# Patient Record
Sex: FEMALE | URBAN - METROPOLITAN AREA
[De-identification: names, ages, dates, MRNs, and addresses within clinical notes are randomized per-mention and may not be internally consistent; named-entity substitution may affect disease eponyms.]

---

## 2019-06-27 ENCOUNTER — APPOINTMENT (RX ONLY)
Age: 49
Setting detail: DERMATOLOGY
End: 2019-06-27

## 2019-06-27 DIAGNOSIS — L65.9 NONSCARRING HAIR LOSS, UNSPECIFIED: ICD-10-CM

## 2019-06-27 DIAGNOSIS — L71.8 OTHER ROSACEA: ICD-10-CM

## 2019-06-27 PROCEDURE — ? COUNSELING

## 2019-06-27 PROCEDURE — 99202 OFFICE O/P NEW SF 15 MIN: CPT

## 2019-06-27 PROCEDURE — ? PRESCRIPTION

## 2019-06-27 PROCEDURE — ? DIAGNOSIS COMMENT

## 2019-06-27 RX ORDER — METRONIDAZOLE 7.5 MG/G
CREAM TOPICAL
Qty: 1 | Refills: 3 | Status: ERX | COMMUNITY
Start: 2019-06-27

## 2019-06-27 RX ORDER — CLOBETASOL PROPIONATE 0.5 MG/ML
SOLUTION TOPICAL
Qty: 1 | Refills: 2 | Status: ERX | COMMUNITY
Start: 2019-06-27

## 2019-06-27 RX ADMIN — CLOBETASOL PROPIONATE: 0.5 SOLUTION TOPICAL at 22:09

## 2019-06-27 RX ADMIN — METRONIDAZOLE: 7.5 CREAM TOPICAL at 22:09

## 2019-06-27 ASSESSMENT — LOCATION DETAILED DESCRIPTION DERM: LOCATION DETAILED: LEFT MEDIAL FRONTAL SCALP

## 2019-06-27 ASSESSMENT — LOCATION SIMPLE DESCRIPTION DERM: LOCATION SIMPLE: LEFT SCALP

## 2019-06-27 ASSESSMENT — LOCATION ZONE DERM: LOCATION ZONE: SCALP

## 2019-06-27 NOTE — HPI: SECONDARY COMPLAINT
How Severe Is This Condition?: mild
Additional History: She reports that her face feels warm to the touch when she has the rash.  The rash usually appears with changes in temperature.

## 2019-06-27 NOTE — PROCEDURE: DIAGNOSIS COMMENT
Comment: She has some mild thinning on top of scalp and mild scaling. Possibly related to the CTD, but no scarring alopecia, no severe scaling and erythema. Will start with clobetasol solution 2x/day as needed (when experiencing symptoms.) She has had multiple labs checked (ferritin, TSH, among others.) Will have her f/u in a few months to recheck, sooner if needed.
Detail Level: Zone
Comment: She has erythema and papules on the cheeks and chin, as well as telangiectasias. This is more c/w rosacea, rather than a CTD. Will start topical metronidazole. Discussed importance of sun protection.
Detail Level: Simple